# Patient Record
Sex: FEMALE
[De-identification: names, ages, dates, MRNs, and addresses within clinical notes are randomized per-mention and may not be internally consistent; named-entity substitution may affect disease eponyms.]

---

## 2018-03-27 ENCOUNTER — RESULT REVIEW (OUTPATIENT)
Age: 58
End: 2018-03-27

## 2019-04-10 ENCOUNTER — RESULT REVIEW (OUTPATIENT)
Age: 59
End: 2019-04-10

## 2019-07-12 PROBLEM — Z00.00 ENCOUNTER FOR PREVENTIVE HEALTH EXAMINATION: Status: ACTIVE | Noted: 2019-07-12

## 2019-07-18 ENCOUNTER — APPOINTMENT (OUTPATIENT)
Dept: SURGICAL ONCOLOGY | Facility: CLINIC | Age: 59
End: 2019-07-18
Payer: COMMERCIAL

## 2019-07-18 VITALS
HEART RATE: 58 BPM | RESPIRATION RATE: 15 BRPM | DIASTOLIC BLOOD PRESSURE: 78 MMHG | SYSTOLIC BLOOD PRESSURE: 146 MMHG | WEIGHT: 117 LBS | OXYGEN SATURATION: 99 % | HEIGHT: 65 IN | BODY MASS INDEX: 19.49 KG/M2

## 2019-07-18 DIAGNOSIS — Z82.0 FAMILY HISTORY OF EPILEPSY AND OTHER DISEASES OF THE NERVOUS SYSTEM: ICD-10-CM

## 2019-07-18 DIAGNOSIS — Z86.79 PERSONAL HISTORY OF OTHER DISEASES OF THE CIRCULATORY SYSTEM: ICD-10-CM

## 2019-07-18 DIAGNOSIS — Z80.3 FAMILY HISTORY OF MALIGNANT NEOPLASM OF BREAST: ICD-10-CM

## 2019-07-18 DIAGNOSIS — Z78.9 OTHER SPECIFIED HEALTH STATUS: ICD-10-CM

## 2019-07-18 DIAGNOSIS — N64.52 NIPPLE DISCHARGE: ICD-10-CM

## 2019-07-18 PROCEDURE — 99244 OFF/OP CNSLTJ NEW/EST MOD 40: CPT

## 2019-07-19 NOTE — HISTORY OF PRESENT ILLNESS
[de-identified] : Lizabeth is a pleasant 59 year-old female who presents for an initial consultation.  She completed a bilateral mammogram and sonogram in February 2019 with BIRADS 2 findings.  She began to experience intermittent gray colored right nipple discharge and was referred for a right-sided mammogram and sonogram in July 2019.  Studies demonstrated benign findings and report stated "discharge is physiologic and benign."\par \par Lizabeth has a family history of breast cancer involving her maternal cousin diagnosed at age 49.

## 2019-07-19 NOTE — ASSESSMENT
[FreeTextEntry1] : We discussed the nipple discharge is no bloody in nature, hemoccult negative and not associated with an underlying radiographic abnormality.  Lizabeth will follow up in 3 months.  She will touch base with use next week to review cytology results.

## 2019-07-19 NOTE — PHYSICAL EXAM
[Normal] : supple, no neck mass and thyroid not enlarged [Normal Supraclavicular Lymph Nodes] : normal supraclavicular lymph nodes [Normal Axillary Lymph Nodes] : normal axillary lymph nodes [FreeTextEntry1] : AB present during exam  [Normal] : no peripheral adenopathy appreciated [Normal Neck Lymph Nodes] : normal neck lymph nodes  [Normal Groin Lymph Nodes] : normal groin lymph nodes [de-identified] : No dominanat masses.  There is dark green/grey fluid expressed from the 9:00 position of the right nipple.  Hemoccult (-).  There is no palpable axillary lymphadenopathy on either side.

## 2019-07-19 NOTE — REASON FOR VISIT
[Initial Consultation] : an initial consultation for [FreeTextEntry2] : right sided nipple discharge

## 2019-07-30 LAB — OTHER FLUID CYTOLOGY: NORMAL

## 2019-10-17 ENCOUNTER — APPOINTMENT (OUTPATIENT)
Dept: SURGICAL ONCOLOGY | Facility: CLINIC | Age: 59
End: 2019-10-17

## 2021-01-05 ENCOUNTER — RESULT REVIEW (OUTPATIENT)
Age: 61
End: 2021-01-05

## 2022-01-17 DIAGNOSIS — Z12.39 ENCOUNTER FOR OTHER SCREENING FOR MALIGNANT NEOPLASM OF BREAST: ICD-10-CM

## 2022-01-17 DIAGNOSIS — R92.2 INCONCLUSIVE MAMMOGRAM: ICD-10-CM

## 2022-03-10 ENCOUNTER — APPOINTMENT (OUTPATIENT)
Dept: OBGYN | Facility: CLINIC | Age: 62
End: 2022-03-10
Payer: COMMERCIAL

## 2022-03-10 VITALS
SYSTOLIC BLOOD PRESSURE: 142 MMHG | HEIGHT: 65 IN | WEIGHT: 119 LBS | DIASTOLIC BLOOD PRESSURE: 80 MMHG | BODY MASS INDEX: 19.83 KG/M2

## 2022-03-10 DIAGNOSIS — Z01.419 ENCOUNTER FOR GYNECOLOGICAL EXAMINATION (GENERAL) (ROUTINE) W/OUT ABNORMAL FINDINGS: ICD-10-CM

## 2022-03-10 PROCEDURE — 82270 OCCULT BLOOD FECES: CPT

## 2022-03-10 PROCEDURE — 99396 PREV VISIT EST AGE 40-64: CPT

## 2022-03-11 LAB — HPV HIGH+LOW RISK DNA PNL CVX: NOT DETECTED

## 2022-03-16 LAB — CYTOLOGY CVX/VAG DOC THIN PREP: ABNORMAL

## 2022-08-01 ENCOUNTER — APPOINTMENT (OUTPATIENT)
Dept: ORTHOPEDIC SURGERY | Facility: CLINIC | Age: 62
End: 2022-08-01

## 2022-08-01 VITALS — HEIGHT: 65 IN | WEIGHT: 119 LBS | BODY MASS INDEX: 19.83 KG/M2

## 2022-08-01 DIAGNOSIS — S33.5XXA SPRAIN OF LIGAMENTS OF LUMBAR SPINE, INITIAL ENCOUNTER: ICD-10-CM

## 2022-08-01 DIAGNOSIS — M47.816 SPONDYLOSIS W/OUT MYELOPATHY OR RADICULOPATHY, LUMBAR REGION: ICD-10-CM

## 2022-08-01 PROCEDURE — 99203 OFFICE O/P NEW LOW 30 MIN: CPT

## 2022-08-01 PROCEDURE — 72170 X-RAY EXAM OF PELVIS: CPT | Mod: RT

## 2022-08-01 PROCEDURE — 99072 ADDL SUPL MATRL&STAF TM PHE: CPT

## 2022-08-01 PROCEDURE — 72100 X-RAY EXAM L-S SPINE 2/3 VWS: CPT

## 2022-08-01 NOTE — REASON FOR VISIT
[FreeTextEntry2] : Patient is a 62 year old female with c/o right hip and lower back pain for about 2 weeks. No injury or trauma. Very active in the gym Occasionally applies ice. Takes Advil. No radicular pain or N/T legs.

## 2022-08-01 NOTE — HISTORY OF PRESENT ILLNESS
[Lower back] : lower back [Gradual] : gradual [6] : 6 [3] : 3 [Dull/Aching] : dull/aching [Occasional] : occasional [Leisure] : leisure [Rest] : rest [Ice] : ice [Walking] : walking [Exercising] : exercising [Part time] : Work status: part time [de-identified] : Right hip and lower back pain [] : no

## 2022-08-01 NOTE — PHYSICAL EXAM
[Straightening consistent with spasm] : Straightening consistent with spasm [Disc space narrowing] : Disc space narrowing [AP] : anteroposterior [Mild arthritis (Tonnis Grade 1)] : Mild arthritis (Tonnis Grade 1) [Right] : right hip [5___] : flexion 5[unfilled]/5 [FreeTextEntry1] : severe narrowing L5S1 disc space, arthritis noted [] : negative CAROLINA test

## 2023-03-03 DIAGNOSIS — Z12.39 ENCOUNTER FOR OTHER SCREENING FOR MALIGNANT NEOPLASM OF BREAST: ICD-10-CM

## 2023-03-22 NOTE — CONSULT LETTER
[DrEspinoza  ___] : Dr. KENNY [Dear  ___] : Dear ~CLAUDY, [Consult Letter:] : I had the pleasure of evaluating your patient, [unfilled]. [Please see my note below.] : Please see my note below. [Consult Closing:] : Thank you very much for allowing me to participate in the care of this patient.  If you have any questions, please do not hesitate to contact me. [Sincerely,] : Sincerely, [FreeTextEntry2] : Renee Moreno MD [FreeTextEntry1] : Lizabeth is a pleasant 59 year-old female who presents for an initial consultation.  She completed a bilateral mammogram and sonogram in February 2019 with BIRADS 2 findings.  She began to experience intermittent gray colored right nipple discharge and was referred for a right-sided mammogram and sonogram in July 2019.  Studies demonstrated benign findings and report stated "discharge is physiologic and benign."\par \par Lizabeth has a family history of breast cancer involving her maternal cousin diagnosed at age 49. \par \par On exam, there are no dominant breast masses.  There is dark green/grey fluid expressed from the 9:00 position of the right nipple.  Hemoccult (-).  Cytology is sent There is no palpable axillary lymphadenopathy on either side.\par \par We discussed the nipple discharge is no bloody in nature, hemoccult negative and not associated with an underlying radiographic abnormality.  Lizabeth will follow up in 3 months.  She will touch base with use next week to review cytology results.  [FreeTextEntry3] : Isaías Roca MD\par Surgical Oncology\par [facility]\par Office: [phone]\par Cell: [phone]\par  Cyclosporine Counseling:  I discussed with the patient the risks of cyclosporine including but not limited to hypertension, gingival hyperplasia,myelosuppression, immunosuppression, liver damage, kidney damage, neurotoxicity, lymphoma, and serious infections. The patient understands that monitoring is required including baseline blood pressure, CBC, CMP, lipid panel and uric acid, and then 1-2 times monthly CMP and blood pressure.

## 2023-07-27 ENCOUNTER — TRANSCRIPTION ENCOUNTER (OUTPATIENT)
Age: 63
End: 2023-07-27

## 2023-09-01 ENCOUNTER — APPOINTMENT (OUTPATIENT)
Dept: OBGYN | Facility: CLINIC | Age: 63
End: 2023-09-01

## 2024-07-15 ENCOUNTER — APPOINTMENT (OUTPATIENT)
Dept: ORTHOPEDIC SURGERY | Facility: CLINIC | Age: 64
End: 2024-07-15
Payer: COMMERCIAL

## 2024-07-15 VITALS — HEIGHT: 65 IN | BODY MASS INDEX: 19.83 KG/M2 | WEIGHT: 119 LBS

## 2024-07-15 DIAGNOSIS — M76.31 ILIOTIBIAL BAND SYNDROME, RIGHT LEG: ICD-10-CM

## 2024-07-15 DIAGNOSIS — M76.891 OTHER SPECIFIED ENTHESOPATHIES OF RIGHT LOWER LIMB, EXCLUDING FOOT: ICD-10-CM

## 2024-07-15 DIAGNOSIS — M47.816 SPONDYLOSIS W/OUT MYELOPATHY OR RADICULOPATHY, LUMBAR REGION: ICD-10-CM

## 2024-07-15 PROCEDURE — 73502 X-RAY EXAM HIP UNI 2-3 VIEWS: CPT

## 2024-07-15 PROCEDURE — 99213 OFFICE O/P EST LOW 20 MIN: CPT

## 2024-07-15 PROCEDURE — 72100 X-RAY EXAM L-S SPINE 2/3 VWS: CPT

## 2024-08-05 PROBLEM — S76.011A HIP STRAIN, RIGHT, INITIAL ENCOUNTER: Status: ACTIVE | Noted: 2024-08-05

## 2024-08-27 ENCOUNTER — APPOINTMENT (OUTPATIENT)
Dept: ORTHOPEDIC SURGERY | Facility: CLINIC | Age: 64
End: 2024-08-27
Payer: COMMERCIAL

## 2024-08-27 VITALS — BODY MASS INDEX: 19.83 KG/M2 | HEIGHT: 65 IN | WEIGHT: 119 LBS

## 2024-08-27 DIAGNOSIS — M76.891 OTHER SPECIFIED ENTHESOPATHIES OF RIGHT LOWER LIMB, EXCLUDING FOOT: ICD-10-CM

## 2024-08-27 DIAGNOSIS — M76.31 ILIOTIBIAL BAND SYNDROME, RIGHT LEG: ICD-10-CM

## 2024-08-27 PROCEDURE — 99213 OFFICE O/P EST LOW 20 MIN: CPT

## 2024-08-27 NOTE — HISTORY OF PRESENT ILLNESS
[Lower back] : lower back [Gradual] : gradual [6] : 6 [3] : 3 [Dull/Aching] : dull/aching [Occasional] : occasional [Leisure] : leisure [Rest] : rest [Ice] : ice [Walking] : walking [Exercising] : exercising [Part time] : Work status: part time [de-identified] : Right hip and lower back pain [] : no [de-identified] : 7/15/24 [de-identified] : Dr. Cuellar

## 2024-08-27 NOTE — PHYSICAL EXAM
[Straightening consistent with spasm] : Straightening consistent with spasm [Disc space narrowing] : Disc space narrowing [AP] : anteroposterior [Lateral] : lateral [Mild arthritis (Tonnis Grade 1)] : Mild arthritis (Tonnis Grade 1) [Right] : right hip [5___] : flexion 5[unfilled]/5 [FreeTextEntry1] : severe narrowing L5S1 disc space, arthritis noted [] : negative CAROLINA test

## 2024-08-27 NOTE — REASON FOR VISIT
[FreeTextEntry2] : Patient complains of continued right hip and lower back pain since last visit. Would like to discuss possible MRI.

## 2025-08-27 ENCOUNTER — NON-APPOINTMENT (OUTPATIENT)
Age: 65
End: 2025-08-27